# Patient Record
Sex: FEMALE | Race: BLACK OR AFRICAN AMERICAN | NOT HISPANIC OR LATINO | Employment: STUDENT | URBAN - METROPOLITAN AREA
[De-identification: names, ages, dates, MRNs, and addresses within clinical notes are randomized per-mention and may not be internally consistent; named-entity substitution may affect disease eponyms.]

---

## 2023-02-16 ENCOUNTER — HOSPITAL ENCOUNTER (EMERGENCY)
Facility: CLINIC | Age: 18
Discharge: HOME | End: 2023-02-16
Admitting: NURSE PRACTITIONER
Payer: COMMERCIAL

## 2023-02-16 VITALS
SYSTOLIC BLOOD PRESSURE: 136 MMHG | RESPIRATION RATE: 16 BRPM | TEMPERATURE: 98.1 F | DIASTOLIC BLOOD PRESSURE: 78 MMHG | OXYGEN SATURATION: 100 % | HEART RATE: 75 BPM

## 2023-02-16 DIAGNOSIS — S61.211A LACERATION OF LEFT INDEX FINGER WITHOUT FOREIGN BODY WITHOUT DAMAGE TO NAIL, INITIAL ENCOUNTER: ICD-10-CM

## 2023-02-16 PROCEDURE — 99283 EMERGENCY DEPT VISIT LOW MDM: CPT | Mod: 25 | Performed by: NURSE PRACTITIONER

## 2023-02-16 PROCEDURE — 12001 RPR S/N/AX/GEN/TRNK 2.5CM/<: CPT | Mod: F1 | Performed by: NURSE PRACTITIONER

## 2023-02-16 PROCEDURE — 250N000011 HC RX IP 250 OP 636: Performed by: EMERGENCY MEDICINE

## 2023-02-16 PROCEDURE — 90715 TDAP VACCINE 7 YRS/> IM: CPT | Performed by: EMERGENCY MEDICINE

## 2023-02-16 PROCEDURE — 99282 EMERGENCY DEPT VISIT SF MDM: CPT | Mod: 25 | Performed by: NURSE PRACTITIONER

## 2023-02-16 PROCEDURE — 90471 IMMUNIZATION ADMIN: CPT | Performed by: EMERGENCY MEDICINE

## 2023-02-16 RX ORDER — LIDOCAINE HYDROCHLORIDE AND EPINEPHRINE 10; 10 MG/ML; UG/ML
INJECTION, SOLUTION INFILTRATION; PERINEURAL
Status: DISCONTINUED
Start: 2023-02-16 | End: 2023-02-16 | Stop reason: HOSPADM

## 2023-02-16 RX ADMIN — CLOSTRIDIUM TETANI TOXOID ANTIGEN (FORMALDEHYDE INACTIVATED), CORYNEBACTERIUM DIPHTHERIAE TOXOID ANTIGEN (FORMALDEHYDE INACTIVATED), BORDETELLA PERTUSSIS TOXOID ANTIGEN (GLUTARALDEHYDE INACTIVATED), BORDETELLA PERTUSSIS FILAMENTOUS HEMAGGLUTININ ANTIGEN (FORMALDEHYDE INACTIVATED), BORDETELLA PERTUSSIS PERTACTIN ANTIGEN, AND BORDETELLA PERTUSSIS FIMBRIAE 2/3 ANTIGEN 0.5 ML: 5; 2; 2.5; 5; 3; 5 INJECTION, SUSPENSION INTRAMUSCULAR at 17:38

## 2023-02-16 NOTE — ED PROVIDER NOTES
[place] EMERGENCY DEPARTMENT ([facility])  2/16/23  History     Chief Complaint   Patient presents with     Laceration     HPI  Loly Benavidez is a 18 year old female who presents to the ED for evaluation of a laceration.  Patient reports she was attempting to remove the pit of an avocado with a knife approximately 20 minutes prior to presentation to the emergency department, when she missed the avocado and sliced into her right index finger.  Bleeding is well controlled with direct pressure.  She has full sensation in her fingertip and is able to fully move all joints in the finger.  She has not taken any medication for pain.    Per [facility], patient's last tetanus immunization was administered on 8/18/2014.     Past Medical History  No past medical history on file.  No past surgical history on file.  No current outpatient medications on file.    No Known Allergies  Family History  No family history on file.  Social History       Past medical history, past surgical history, medications, allergies, family history, and social history were reviewed with the patient. No additional pertinent items.     Review of Systems  A complete review of systems was performed with pertinent positives and negatives noted in the HPI, and all other systems negative.    Physical Exam   BP: 136/78  Pulse: 75  Temp: 98.1  F (36.7  C)  Resp: 16  SpO2: 100 %      Physical Exam  Constitutional:       Appearance: Normal appearance.   HENT:      Head: Normocephalic and atraumatic.   Cardiovascular:      Rate and Rhythm: Normal rate.   Pulmonary:      Effort: Pulmonary effort is normal.   Musculoskeletal:         General: Normal range of motion.   Skin:     General: Skin is warm and dry.      Capillary Refill: Capillary refill takes less than 2 seconds.      Comments: 2 cm laceration to index finger of left hand on palmar side between MCP and PIP.  Bleeding well controlled with direct pressure.   Neurological:      General: No focal  deficit present.      Mental Status: She is alert and oriented to person, place, and time.      Comments: Full sensation distal to laceration   Psychiatric:         Mood and Affect: Mood normal.         Behavior: Behavior normal.         ED Course, Procedures, & Data      [facility]    -Laceration Repair    Date/Time: 2/16/2023 7:20 PM  Performed by: Karin Dawn APRN CNP  Authorized by: Karin Dawn APRN CNP     Risks, benefits and alternatives discussed.      ANESTHESIA (see MAR for exact dosages):     Anesthesia method:  Nerve block and local infiltration    Local anesthetic:  Lidocaine 1% WITH epi    Block location:  Palmar surface index finger     Block needle gauge:  27 G    Block anesthetic:  Lidocaine 1% WITH epi    Block injection procedure:  Anatomic landmarks identified, introduced needle, incremental injection, anatomic landmarks palpated and negative aspiration for blood    Block outcome:  Incomplete block      LACERATION DETAILS     Location:  Finger    Finger location:  L index finger    Length (cm):  2    REPAIR TYPE:     Repair type:  Simple      EXPLORATION:     Hemostasis achieved with:  Direct pressure    Wound exploration: wound explored through full range of motion      Wound extent: no foreign body, no signs of injury, no nerve damage, no tendon damage and no vascular damage      Contaminated: no      TREATMENT:     Area cleansed with:  Saline    Amount of cleaning:  Standard    Irrigation solution:  Sterile saline    Irrigation volume:  500mL    Irrigation method:  Pressure wash    Visualized foreign bodies/material removed: no      SKIN REPAIR     Repair method:  Sutures    Suture size:  5-0    Suture material:  Prolene    Suture technique:  Simple interrupted    Number of sutures:  4    APPROXIMATION     Approximation:  Close    POST-PROCEDURE DETAILS     Dressing:  Antibiotic ointment and tube gauze        PROCEDURE    Patient  Tolerance:  Patient tolerated the procedure well with no immediate complications         No results found for this or any previous visit (from the past 24 hour(s)).  Medications   lidocaine 1% with EPINEPHrine 1:100,000 1 %-1:[id number] injection (has no administration in time range)   Tdap (tetanus-diphtheria-acell pertussis) (ADACEL) injection 0.5 mL (0.5 mLs Intramuscular Given 2/16/23 [id number])           Medical Decision Making  The patient presented with a problem that is an acute and uncomplicated illness or injury.    The patient's evaluation involved:  review of external note(s) from 2 sources (prior hx and [facility])    The patient's management involved prescription drug management (including medications given in the ED) and a decision regarding minor procedure/surgery with identified risk factors.    Assessments & Plan   Loly Benavidez is a 18 year old female who presents to the ED for evaluation of a laceration to her left index finger.  She had full sensation and range of motion prior to anesthetization of the finger. Laceration was repaired as above with 4 x 5.0 ethylene sutures.  Patient had full range of motion of all joint in finger following repair. discussed laceration care, pain management, follow-up with patient as well as signs and symptoms of infection. Patient advised to follow-up in 5 days for suture removal.     I have reviewed the nursing notes.    I have reviewed the findings, diagnosis, plan and need for follow up with the patient. Patient in agreement with this plan, patient discharged from ED.     There are no discharge medications for this patient.      Final diagnoses:   Laceration of left index finger without foreign body without damage to nail, initial encounter       AMY Gonzalez CNP  2/16/2023   [facility] EMERGENCY DEPARTMENT     Karin Dawn APRN CNP  02/16/23 1947

## 2023-02-16 NOTE — ED TRIAGE NOTES
From home for finger lac to left index from a kitchen knife  Unsure last tetanus  Vss, minimal pain     Triage Assessment     Row Name 02/16/23 8759       Triage Assessment (Adult)    Airway WDL WDL       Respiratory WDL    Respiratory WDL WDL       Skin Circulation/Temperature WDL    Skin Circulation/Temperature WDL WDL       Cardiac WDL    Cardiac WDL WDL       Peripheral/Neurovascular WDL    Peripheral Neurovascular WDL WDL       Cognitive/Neuro/Behavioral WDL    Cognitive/Neuro/Behavioral WDL WDL

## 2023-02-17 NOTE — DISCHARGE INSTRUCTIONS
Please make an appointment to follow up with Your Primary Care Provider or [facility] ([phone]) for suture removal in 5 days.     You received 4 sutures that will need to be removed.     Keep current dressing in place for next 24 hours (unless gets wet, then may remove early). After you remove this dressing, use warm water and soap to clean lacerations. May apply bacitracin and regular bandage to wound to keep clean. Do not submerge underwater (i.e. doing dishes, soaking hand in a bathtub/hottub, swimming in pool or lake) until sutures are removed.     Take tylenol or ibuprofen for pain.    Please return sooner if increased redness, swelling, pain, uncontrolled bleeding, drainage, or fevers in or around wound, or if stiffness in joints or inability to bend joints of the fingers, or if finger tip loses feeling, or finger tip is discolored (blue purple) please return to ED.

## 2023-02-23 ENCOUNTER — ALLIED HEALTH/NURSE VISIT (OUTPATIENT)
Dept: NURSING | Facility: CLINIC | Age: 18
End: 2023-02-23
Payer: COMMERCIAL

## 2023-02-23 DIAGNOSIS — Z48.02 ENCOUNTER FOR REMOVAL OF SUTURES: Primary | ICD-10-CM

## 2023-02-23 PROCEDURE — 99207 PR NO CHARGE NURSE ONLY: CPT

## 2023-02-23 NOTE — PROGRESS NOTES
Loly K Reema presents to the clinic today for removal of sutures.  The patient has had the sutures in place for 7 days.  There has been no history of infection or drainage.  4 sutures are seen located on the L index finger .  The wound is healing well with no signs of infection.  Tetanus status is up to date.   All sutures were easily removed today.  Routine wound care discussed.  The patient will follow up as needed.    FAVIAN Felix RN  [facility]